# Patient Record
Sex: MALE | Race: BLACK OR AFRICAN AMERICAN | NOT HISPANIC OR LATINO | Employment: PART TIME | URBAN - METROPOLITAN AREA
[De-identification: names, ages, dates, MRNs, and addresses within clinical notes are randomized per-mention and may not be internally consistent; named-entity substitution may affect disease eponyms.]

---

## 2021-05-22 ENCOUNTER — HOSPITAL ENCOUNTER (EMERGENCY)
Facility: CLINIC | Age: 21
Discharge: HOME | End: 2021-05-23
Attending: STUDENT IN AN ORGANIZED HEALTH CARE EDUCATION/TRAINING PROGRAM | Admitting: STUDENT IN AN ORGANIZED HEALTH CARE EDUCATION/TRAINING PROGRAM
Payer: COMMERCIAL

## 2021-05-22 DIAGNOSIS — M25.551 HIP PAIN, RIGHT: ICD-10-CM

## 2021-05-22 PROCEDURE — 99283 EMERGENCY DEPT VISIT LOW MDM: CPT | Performed by: STUDENT IN AN ORGANIZED HEALTH CARE EDUCATION/TRAINING PROGRAM

## 2021-05-22 PROCEDURE — 99282 EMERGENCY DEPT VISIT SF MDM: CPT | Performed by: STUDENT IN AN ORGANIZED HEALTH CARE EDUCATION/TRAINING PROGRAM

## 2021-05-22 SDOH — HEALTH STABILITY: MENTAL HEALTH: HOW OFTEN DO YOU HAVE A DRINK CONTAINING ALCOHOL?: NEVER

## 2021-05-23 ENCOUNTER — APPOINTMENT (OUTPATIENT)
Dept: GENERAL RADIOLOGY | Facility: CLINIC | Age: 21
End: 2021-05-23
Attending: STUDENT IN AN ORGANIZED HEALTH CARE EDUCATION/TRAINING PROGRAM
Payer: COMMERCIAL

## 2021-05-23 VITALS
DIASTOLIC BLOOD PRESSURE: 83 MMHG | WEIGHT: 155 LBS | HEART RATE: 82 BPM | TEMPERATURE: 98.3 F | SYSTOLIC BLOOD PRESSURE: 125 MMHG | OXYGEN SATURATION: 99 % | RESPIRATION RATE: 18 BRPM

## 2021-05-23 PROCEDURE — 73502 X-RAY EXAM HIP UNI 2-3 VIEWS: CPT

## 2021-05-23 NOTE — ED PROVIDER NOTES
"    [facility] EMERGENCY DEPARTMENT ([facility])         5/22/21  History     Chief Complaint   Patient presents with     Leg Pain     Pt has pain in R buttocks that he states feels like tightening there.  Pt has the pain when sleeping.     The history is provided by the patient and medical records.     Tim Irvin is a 21 year old male who presents to the Emergency Department for evaluation of right gluteal pain. Patient reports he has had this ongoing pain for a couple of months. He reports this gluteal pain has recently increased to the point where it wakes him up from sleep. He reports he initially thought this pain was secondary to soreness after playing soccer. He reports this is a \"piniching\" pain. He reports he has become increasingly worried as his brother has a history of rheumatoid arthritis. Patient denies any known medical problems.    I have reviewed the Medications, Allergies, Past Medical and Surgical History, and Social History in the [email or web address] system.  PAST MEDICAL HISTORY: History reviewed. No pertinent past medical history.    PAST SURGICAL HISTORY: History reviewed. No pertinent surgical history.    Past medical history, past surgical history, medications, and allergies were reviewed with the patient. Additional pertinent items: None    FAMILY HISTORY: History reviewed. No pertinent family history.    SOCIAL HISTORY:   Social History     Tobacco Use     Smoking status: Never Smoker     Smokeless tobacco: Never Used   Substance Use Topics     Alcohol use: Never     Frequency: Never     Social history was reviewed with the patient. Additional pertinent items: None      Patient's Medications    No medications on file        No Known Allergies     Review of Systems  A complete review of systems was performed with pertinent positives and negatives noted in the HPI, and all other systems negative    Physical Exam   BP: (!) 140/102  Pulse: 75  Temp: 98.3  F (36.8  C)  Resp: 16  Weight: 70.3 kg (155 " lb)  SpO2: 100 %      Physical Exam  Vitals signs and nursing note reviewed.       General: no acute distress. Appears stated age.   HENT: MMM, no oropharyngeal lesions  Eyes: PERRL, normal sclerae  Neck: non-tender, supple  Cardio: reg rate. Regular rhythm. Extremities well perfused  Resp: Normal work of breathing, normal respiratory rate.  Chest/Back: no visual signs of trauma, no CVA tenderness  Abdomen: no tenderness, non-distended, no rebound, no guarding  Neuro: alert and fully oriented. CN II-XII grossly intact. Grossly normal strength and sensation in all extremities.   MSK: no deformities. Grossly normal ROM in extremities. With internal rotation of patient's left hip endorses tenderness in the gluteal/sciatic region. Negative straight leg testing.  Integumentary/Skin: no rash visualized, normal color  Psych: normal affect, normal behavior    ED Course   12:02 AM  The patient was seen and examined by Placido Wayne MD in Room ED20.            Results for orders placed or performed during the hospital encounter of 05/22/21 (from the past 24 hour(s))   XR Pelvis w Hip Right G/E 2 Views    Narrative    EXAM: PELVIS AND RIGHT HIP 2 VIEWS  LOCATION: [facility]  DATE/TIME: 5/23/2021 12:13 AM    INDICATION: Right hip pain.  COMPARISON: None.      Impression    IMPRESSION: No visualized acute fracture, malalignment or other acute osseous abnormality of the pelvis or right hip.      Medications - No data to display          Assessments & Plan (with Medical Decision Making)   This is a 21-year-old male with no pertinent past medical history who has a sounds like a musculoskeletal injury of his right gluteal muscles.  He is an avid  and does not have any symptoms of sciatic nerve pain.  Rather it is more focal pain around the gluteal muscles around the piriformis area as well.  He will need to follow-up with a physical therapist.  His x-ray here was unremarkable.  Vitals are stable and he has  no other significant medical problems or signs of injury.    I have reviewed the nursing notes.    I have reviewed the findings, diagnosis, plan and need for follow up with the patient.    Impression:  Final diagnoses:   Hip pain, right   I, Bernard Quach, am serving as a trained medical scribe to document services personally performed by Placido Wayne MD, based on the provider's statements to me.      I, Placido Wayne MD, was physically present and have reviewed and verified the accuracy of this note documented by Bernard Quach.     5/22/2021   [facility] EMERGENCY DEPARTMENT     Placido Wayne MD  05/23/21 0107

## 2021-05-23 NOTE — DISCHARGE INSTRUCTIONS
You were seen for right-sided hip and buttock pain.  Your x-ray does not show any problems with your bones or joints.  Please follow-up with a physical therapist to help treat your likely muscle strain.  This should get better over time but if it continues to get worse follow-up with a primary medical doctor.

## 2021-05-23 NOTE — ED TRIAGE NOTES
Pt has pain in R buttocks that he states feels like tightening there.  Pt has the pain when sleeping.  Pt's brother has RA and Pt is concerned of getting RA.